# Patient Record
Sex: FEMALE | Race: ASIAN | NOT HISPANIC OR LATINO | URBAN - METROPOLITAN AREA
[De-identification: names, ages, dates, MRNs, and addresses within clinical notes are randomized per-mention and may not be internally consistent; named-entity substitution may affect disease eponyms.]

---

## 2017-01-03 ENCOUNTER — OUTPATIENT (OUTPATIENT)
Dept: OUTPATIENT SERVICES | Facility: HOSPITAL | Age: 47
LOS: 1 days | End: 2017-01-03
Payer: MEDICAID

## 2017-01-03 ENCOUNTER — APPOINTMENT (OUTPATIENT)
Dept: GASTROENTEROLOGY | Facility: HOSPITAL | Age: 47
End: 2017-01-03

## 2017-01-03 VITALS
HEART RATE: 67 BPM | DIASTOLIC BLOOD PRESSURE: 80 MMHG | WEIGHT: 137 LBS | HEIGHT: 69 IN | BODY MASS INDEX: 20.29 KG/M2 | SYSTOLIC BLOOD PRESSURE: 123 MMHG

## 2017-01-03 DIAGNOSIS — K31.9 DISEASE OF STOMACH AND DUODENUM, UNSPECIFIED: ICD-10-CM

## 2017-01-03 DIAGNOSIS — K83.9 DISEASE OF BILIARY TRACT, UNSPECIFIED: ICD-10-CM

## 2017-01-03 PROCEDURE — G0463: CPT

## 2017-01-04 DIAGNOSIS — K83.9 DISEASE OF BILIARY TRACT, UNSPECIFIED: ICD-10-CM

## 2017-01-13 ENCOUNTER — APPOINTMENT (OUTPATIENT)
Dept: MRI IMAGING | Facility: CLINIC | Age: 47
End: 2017-01-13

## 2017-01-13 ENCOUNTER — OUTPATIENT (OUTPATIENT)
Dept: OUTPATIENT SERVICES | Facility: HOSPITAL | Age: 47
LOS: 1 days | End: 2017-01-13
Payer: MEDICAID

## 2017-01-13 DIAGNOSIS — Z00.8 ENCOUNTER FOR OTHER GENERAL EXAMINATION: ICD-10-CM

## 2017-01-13 DIAGNOSIS — K83.9 DISEASE OF BILIARY TRACT, UNSPECIFIED: ICD-10-CM

## 2017-01-13 PROCEDURE — 74183 MRI ABD W/O CNTR FLWD CNTR: CPT

## 2017-01-13 PROCEDURE — A9585: CPT

## 2017-06-14 ENCOUNTER — INPATIENT (INPATIENT)
Facility: HOSPITAL | Age: 47
LOS: 0 days | Discharge: HOME | DRG: 552 | End: 2017-06-15
Attending: HOSPITALIST | Admitting: STUDENT IN AN ORGANIZED HEALTH CARE EDUCATION/TRAINING PROGRAM
Payer: MEDICAID

## 2017-06-14 VITALS
SYSTOLIC BLOOD PRESSURE: 129 MMHG | RESPIRATION RATE: 18 BRPM | TEMPERATURE: 98 F | HEART RATE: 60 BPM | DIASTOLIC BLOOD PRESSURE: 87 MMHG | OXYGEN SATURATION: 100 %

## 2017-06-14 LAB
ALBUMIN SERPL ELPH-MCNC: 4.5 G/DL — SIGNIFICANT CHANGE UP (ref 3.3–5)
ALP SERPL-CCNC: 38 U/L — LOW (ref 40–120)
ALT FLD-CCNC: 15 U/L RC — SIGNIFICANT CHANGE UP (ref 10–45)
ANION GAP SERPL CALC-SCNC: 11 MMOL/L — SIGNIFICANT CHANGE UP (ref 5–17)
APPEARANCE UR: CLEAR — SIGNIFICANT CHANGE UP
APTT BLD: 41.1 SEC — HIGH (ref 27.5–37.4)
AST SERPL-CCNC: 17 U/L — SIGNIFICANT CHANGE UP (ref 10–40)
BASOPHILS # BLD AUTO: 0 K/UL — SIGNIFICANT CHANGE UP (ref 0–0.2)
BASOPHILS NFR BLD AUTO: 0.4 % — SIGNIFICANT CHANGE UP (ref 0–2)
BILIRUB SERPL-MCNC: 0.4 MG/DL — SIGNIFICANT CHANGE UP (ref 0.2–1.2)
BILIRUB UR-MCNC: NEGATIVE — SIGNIFICANT CHANGE UP
BUN SERPL-MCNC: 8 MG/DL — SIGNIFICANT CHANGE UP (ref 7–23)
CALCIUM SERPL-MCNC: 8.8 MG/DL — SIGNIFICANT CHANGE UP (ref 8.4–10.5)
CHLORIDE SERPL-SCNC: 105 MMOL/L — SIGNIFICANT CHANGE UP (ref 96–108)
CO2 SERPL-SCNC: 25 MMOL/L — SIGNIFICANT CHANGE UP (ref 22–31)
COLOR SPEC: YELLOW — SIGNIFICANT CHANGE UP
CREAT SERPL-MCNC: 0.63 MG/DL — SIGNIFICANT CHANGE UP (ref 0.5–1.3)
DIFF PNL FLD: NEGATIVE — SIGNIFICANT CHANGE UP
EOSINOPHIL # BLD AUTO: 0.1 K/UL — SIGNIFICANT CHANGE UP (ref 0–0.5)
EOSINOPHIL NFR BLD AUTO: 1.2 % — SIGNIFICANT CHANGE UP (ref 0–6)
EPI CELLS # UR: SIGNIFICANT CHANGE UP /HPF
GLUCOSE SERPL-MCNC: 94 MG/DL — SIGNIFICANT CHANGE UP (ref 70–99)
GLUCOSE UR QL: NEGATIVE — SIGNIFICANT CHANGE UP
HCT VFR BLD CALC: 36.2 % — SIGNIFICANT CHANGE UP (ref 34.5–45)
HGB BLD-MCNC: 12.5 G/DL — SIGNIFICANT CHANGE UP (ref 11.5–15.5)
INR BLD: 1.09 RATIO — SIGNIFICANT CHANGE UP (ref 0.88–1.16)
KETONES UR-MCNC: NEGATIVE — SIGNIFICANT CHANGE UP
LEUKOCYTE ESTERASE UR-ACNC: NEGATIVE — SIGNIFICANT CHANGE UP
LYMPHOCYTES # BLD AUTO: 1.5 K/UL — SIGNIFICANT CHANGE UP (ref 1–3.3)
LYMPHOCYTES # BLD AUTO: 25.7 % — SIGNIFICANT CHANGE UP (ref 13–44)
MCHC RBC-ENTMCNC: 32.2 PG — SIGNIFICANT CHANGE UP (ref 27–34)
MCHC RBC-ENTMCNC: 34.4 GM/DL — SIGNIFICANT CHANGE UP (ref 32–36)
MCV RBC AUTO: 93.5 FL — SIGNIFICANT CHANGE UP (ref 80–100)
MONOCYTES # BLD AUTO: 0.4 K/UL — SIGNIFICANT CHANGE UP (ref 0–0.9)
MONOCYTES NFR BLD AUTO: 6.7 % — SIGNIFICANT CHANGE UP (ref 2–14)
NEUTROPHILS # BLD AUTO: 3.9 K/UL — SIGNIFICANT CHANGE UP (ref 1.8–7.4)
NEUTROPHILS NFR BLD AUTO: 66 % — SIGNIFICANT CHANGE UP (ref 43–77)
NITRITE UR-MCNC: NEGATIVE — SIGNIFICANT CHANGE UP
PH UR: 7 — SIGNIFICANT CHANGE UP (ref 5–8)
PLATELET # BLD AUTO: 227 K/UL — SIGNIFICANT CHANGE UP (ref 150–400)
POTASSIUM SERPL-MCNC: 3.9 MMOL/L — SIGNIFICANT CHANGE UP (ref 3.5–5.3)
POTASSIUM SERPL-SCNC: 3.9 MMOL/L — SIGNIFICANT CHANGE UP (ref 3.5–5.3)
PROT SERPL-MCNC: 7.2 G/DL — SIGNIFICANT CHANGE UP (ref 6–8.3)
PROT UR-MCNC: NEGATIVE — SIGNIFICANT CHANGE UP
PROTHROM AB SERPL-ACNC: 11.9 SEC — SIGNIFICANT CHANGE UP (ref 9.8–12.7)
RBC # BLD: 3.87 M/UL — SIGNIFICANT CHANGE UP (ref 3.8–5.2)
RBC # FLD: 10.9 % — SIGNIFICANT CHANGE UP (ref 10.3–14.5)
SODIUM SERPL-SCNC: 141 MMOL/L — SIGNIFICANT CHANGE UP (ref 135–145)
SP GR SPEC: 1.01 — SIGNIFICANT CHANGE UP (ref 1.01–1.02)
UROBILINOGEN FLD QL: NEGATIVE — SIGNIFICANT CHANGE UP
WBC # BLD: 6 K/UL — SIGNIFICANT CHANGE UP (ref 3.8–10.5)
WBC # FLD AUTO: 6 K/UL — SIGNIFICANT CHANGE UP (ref 3.8–10.5)

## 2017-06-14 PROCEDURE — 99285 EMERGENCY DEPT VISIT HI MDM: CPT

## 2017-06-14 RX ORDER — DIAZEPAM 5 MG
5 TABLET ORAL ONCE
Qty: 0 | Refills: 0 | Status: DISCONTINUED | OUTPATIENT
Start: 2017-06-14 | End: 2017-06-14

## 2017-06-14 RX ORDER — MORPHINE SULFATE 50 MG/1
4 CAPSULE, EXTENDED RELEASE ORAL ONCE
Qty: 0 | Refills: 0 | Status: DISCONTINUED | OUTPATIENT
Start: 2017-06-14 | End: 2017-06-14

## 2017-06-14 RX ORDER — IBUPROFEN 200 MG
600 TABLET ORAL ONCE
Qty: 0 | Refills: 0 | Status: COMPLETED | OUTPATIENT
Start: 2017-06-14 | End: 2017-06-14

## 2017-06-14 RX ORDER — OXYCODONE HYDROCHLORIDE 5 MG/1
5 TABLET ORAL ONCE
Qty: 0 | Refills: 0 | Status: DISCONTINUED | OUTPATIENT
Start: 2017-06-14 | End: 2017-06-14

## 2017-06-14 RX ORDER — SODIUM CHLORIDE 9 MG/ML
1000 INJECTION INTRAMUSCULAR; INTRAVENOUS; SUBCUTANEOUS ONCE
Qty: 0 | Refills: 0 | Status: COMPLETED | OUTPATIENT
Start: 2017-06-14 | End: 2017-06-14

## 2017-06-14 RX ORDER — ONDANSETRON 8 MG/1
4 TABLET, FILM COATED ORAL ONCE
Qty: 0 | Refills: 0 | Status: COMPLETED | OUTPATIENT
Start: 2017-06-14 | End: 2017-06-14

## 2017-06-14 RX ORDER — ACETAMINOPHEN 500 MG
1000 TABLET ORAL ONCE
Qty: 0 | Refills: 0 | Status: COMPLETED | OUTPATIENT
Start: 2017-06-14 | End: 2017-06-14

## 2017-06-14 RX ADMIN — Medication 400 MILLIGRAM(S): at 18:25

## 2017-06-14 RX ADMIN — MORPHINE SULFATE 4 MILLIGRAM(S): 50 CAPSULE, EXTENDED RELEASE ORAL at 22:09

## 2017-06-14 RX ADMIN — Medication 600 MILLIGRAM(S): at 18:24

## 2017-06-14 RX ADMIN — SODIUM CHLORIDE 1000 MILLILITER(S): 9 INJECTION INTRAMUSCULAR; INTRAVENOUS; SUBCUTANEOUS at 22:56

## 2017-06-14 RX ADMIN — OXYCODONE HYDROCHLORIDE 5 MILLIGRAM(S): 5 TABLET ORAL at 21:11

## 2017-06-14 RX ADMIN — Medication 1000 MILLIGRAM(S): at 21:09

## 2017-06-14 RX ADMIN — ONDANSETRON 4 MILLIGRAM(S): 8 TABLET, FILM COATED ORAL at 22:56

## 2017-06-14 RX ADMIN — Medication 5 MILLIGRAM(S): at 19:41

## 2017-06-14 RX ADMIN — Medication 600 MILLIGRAM(S): at 21:09

## 2017-06-14 RX ADMIN — OXYCODONE HYDROCHLORIDE 5 MILLIGRAM(S): 5 TABLET ORAL at 22:09

## 2017-06-14 NOTE — ED PROVIDER NOTE - ATTENDING CONTRIBUTION TO CARE
pt is a 45 y/o female with c/o lower back pain, nvi to le, no change in bowel or bladder habits, ua and labs nl, mild lower abd pain, period one week late, upreg pending if nl to d.c home with pain control. likely lumbar strain vs sciatica noted.

## 2017-06-14 NOTE — ED PROVIDER NOTE - OBJECTIVE STATEMENT
46 F h/o pyelonephritis here for low back pain. Severe LBP pain, radiating to groin, onset Sunday, no HU, severe, worse with movement, no melena, no HS, radiates to R leg to knee. Has happened before, admitted for this pain in the past.

## 2017-06-14 NOTE — ED PROVIDER NOTE - PROGRESS NOTE DETAILS
received signout from Dr. Muhammad. pt received valium for b/l LBP, had pt get up to see if pt can walk now. pt took a couple steps and was unable to move/walk 2/2 pain. case discussed with Dr. Dill, will give oxycodone and reassess. -BEAU Hoang pt states pain is unchanged even with valium and oxycodone, pain still 8/10, pt unable to stand up straight 2/2 pain, but able to walk with support. will give morphine and re-evaluate. discussed with pt that if pain improves she can go home, but if not pt will need tba. - BEAU Hoang. pain improved with morphine but pt c/o not feeling well now 2/2 morphine - has nausea. will give IVF and zofran. will re-eval. -BEAU Hoang pt still having pain and unable to walk now. PMD Dr. Flash Chawla. case discussed with Dr. Dill and Dr. Cloud; tba. -BEAU Hoang.

## 2017-06-14 NOTE — ED PROVIDER NOTE - PHYSICAL EXAMINATION
Gen: NAD, AOx3, non-toxic // Head: NCAT // HEENT: EOMI, oral mucosa moist, normal conjunctiva // Lung: CTAB, no respiratory distress, no wheezes/rhonchi/rales B/L, speaking in full sentences. // CV: RRR, no murmurs, rubs or gallops // Abd: soft, tender in bilateral lower quadrants, no guarding, no CVA tenderness // MSK: TTP of lower back across L4-L5 and b/l paraspinal MMs. no visible deformities. +Straight leg raise // Neuro: No focal sensory or motor deficits // Skin: Warm, well perfused, no rash // Psych: normal affect

## 2017-06-14 NOTE — ED ADULT NURSE NOTE - CHPI ED SYMPTOMS NEG
no abrasion/no difficulty bearing weight/no tingling/no bruising/no numbness/no deformity/no stiffness/no fever/no weakness

## 2017-06-14 NOTE — ED ADULT NURSE NOTE - OBJECTIVE STATEMENT
46 yr old female with no medical history present to the ER for lower back pain. As per pt she has been experiencing lower back pain for 1.5 weeks now getting progressively worse. Pt reports that pain is getting progressively worse and over the weekend she was experiencing difficulty. Pt denies any recent injury or lifting heavy objects. Pt is ambulatory at this time. Denies any urinary problem.

## 2017-06-15 VITALS
HEART RATE: 51 BPM | DIASTOLIC BLOOD PRESSURE: 78 MMHG | RESPIRATION RATE: 19 BRPM | TEMPERATURE: 98 F | OXYGEN SATURATION: 97 % | SYSTOLIC BLOOD PRESSURE: 115 MMHG

## 2017-06-15 DIAGNOSIS — R63.8 OTHER SYMPTOMS AND SIGNS CONCERNING FOOD AND FLUID INTAKE: ICD-10-CM

## 2017-06-15 DIAGNOSIS — Z29.9 ENCOUNTER FOR PROPHYLACTIC MEASURES, UNSPECIFIED: ICD-10-CM

## 2017-06-15 DIAGNOSIS — M54.5 LOW BACK PAIN: ICD-10-CM

## 2017-06-15 LAB
ANION GAP SERPL CALC-SCNC: 9 MMOL/L — SIGNIFICANT CHANGE UP (ref 5–17)
BASOPHILS # BLD AUTO: 0.02 K/UL — SIGNIFICANT CHANGE UP (ref 0–0.2)
BASOPHILS NFR BLD AUTO: 0.4 % — SIGNIFICANT CHANGE UP (ref 0–2)
BUN SERPL-MCNC: 8 MG/DL — SIGNIFICANT CHANGE UP (ref 7–23)
CALCIUM SERPL-MCNC: 8.3 MG/DL — LOW (ref 8.4–10.5)
CHLORIDE SERPL-SCNC: 105 MMOL/L — SIGNIFICANT CHANGE UP (ref 96–108)
CO2 SERPL-SCNC: 22 MMOL/L — SIGNIFICANT CHANGE UP (ref 22–31)
CREAT SERPL-MCNC: 0.48 MG/DL — LOW (ref 0.5–1.3)
EOSINOPHIL # BLD AUTO: 0.05 K/UL — SIGNIFICANT CHANGE UP (ref 0–0.5)
EOSINOPHIL NFR BLD AUTO: 0.9 % — SIGNIFICANT CHANGE UP (ref 0–6)
GLUCOSE SERPL-MCNC: 95 MG/DL — SIGNIFICANT CHANGE UP (ref 70–99)
HCT VFR BLD CALC: 32.1 % — LOW (ref 34.5–45)
HGB BLD-MCNC: 10.6 G/DL — LOW (ref 11.5–15.5)
IMM GRANULOCYTES NFR BLD AUTO: 0.2 % — SIGNIFICANT CHANGE UP (ref 0–1.5)
LYMPHOCYTES # BLD AUTO: 1.14 K/UL — SIGNIFICANT CHANGE UP (ref 1–3.3)
LYMPHOCYTES # BLD AUTO: 21.3 % — SIGNIFICANT CHANGE UP (ref 13–44)
MCHC RBC-ENTMCNC: 29.4 PG — SIGNIFICANT CHANGE UP (ref 27–34)
MCHC RBC-ENTMCNC: 33 GM/DL — SIGNIFICANT CHANGE UP (ref 32–36)
MCV RBC AUTO: 89.2 FL — SIGNIFICANT CHANGE UP (ref 80–100)
MONOCYTES # BLD AUTO: 0.26 K/UL — SIGNIFICANT CHANGE UP (ref 0–0.9)
MONOCYTES NFR BLD AUTO: 4.9 % — SIGNIFICANT CHANGE UP (ref 2–14)
NEUTROPHILS # BLD AUTO: 3.88 K/UL — SIGNIFICANT CHANGE UP (ref 1.8–7.4)
NEUTROPHILS NFR BLD AUTO: 72.3 % — SIGNIFICANT CHANGE UP (ref 43–77)
PLATELET # BLD AUTO: 204 K/UL — SIGNIFICANT CHANGE UP (ref 150–400)
POTASSIUM SERPL-MCNC: 3.9 MMOL/L — SIGNIFICANT CHANGE UP (ref 3.5–5.3)
POTASSIUM SERPL-SCNC: 3.9 MMOL/L — SIGNIFICANT CHANGE UP (ref 3.5–5.3)
RBC # BLD: 3.6 M/UL — LOW (ref 3.8–5.2)
RBC # FLD: 12.1 % — SIGNIFICANT CHANGE UP (ref 10.3–14.5)
SODIUM SERPL-SCNC: 136 MMOL/L — SIGNIFICANT CHANGE UP (ref 135–145)
WBC # BLD: 5.36 K/UL — SIGNIFICANT CHANGE UP (ref 3.8–10.5)
WBC # FLD AUTO: 5.36 K/UL — SIGNIFICANT CHANGE UP (ref 3.8–10.5)

## 2017-06-15 PROCEDURE — 99239 HOSP IP/OBS DSCHRG MGMT >30: CPT

## 2017-06-15 PROCEDURE — 99223 1ST HOSP IP/OBS HIGH 75: CPT

## 2017-06-15 RX ORDER — HYDROMORPHONE HYDROCHLORIDE 2 MG/ML
0.5 INJECTION INTRAMUSCULAR; INTRAVENOUS; SUBCUTANEOUS EVERY 6 HOURS
Qty: 0 | Refills: 0 | Status: DISCONTINUED | OUTPATIENT
Start: 2017-06-15 | End: 2017-06-15

## 2017-06-15 RX ORDER — ASCORBIC ACID 60 MG
1 TABLET,CHEWABLE ORAL
Qty: 0 | Refills: 0 | COMMUNITY

## 2017-06-15 RX ORDER — CELECOXIB 200 MG/1
200 CAPSULE ORAL DAILY
Qty: 0 | Refills: 0 | Status: DISCONTINUED | OUTPATIENT
Start: 2017-06-15 | End: 2017-06-15

## 2017-06-15 RX ORDER — CYCLOBENZAPRINE HYDROCHLORIDE 10 MG/1
1 TABLET, FILM COATED ORAL
Qty: 15 | Refills: 0 | OUTPATIENT
Start: 2017-06-15 | End: 2017-06-20

## 2017-06-15 RX ORDER — FAMOTIDINE 10 MG/ML
20 INJECTION INTRAVENOUS
Qty: 0 | Refills: 0 | Status: DISCONTINUED | OUTPATIENT
Start: 2017-06-15 | End: 2017-06-15

## 2017-06-15 RX ORDER — IBUPROFEN 200 MG
600 TABLET ORAL EVERY 8 HOURS
Qty: 0 | Refills: 0 | Status: DISCONTINUED | OUTPATIENT
Start: 2017-06-15 | End: 2017-06-15

## 2017-06-15 RX ORDER — OMEGA-3 ACID ETHYL ESTERS 1 G
1 CAPSULE ORAL
Qty: 0 | Refills: 0 | COMMUNITY

## 2017-06-15 RX ORDER — HYDROMORPHONE HYDROCHLORIDE 2 MG/ML
1 INJECTION INTRAMUSCULAR; INTRAVENOUS; SUBCUTANEOUS EVERY 6 HOURS
Qty: 0 | Refills: 0 | Status: DISCONTINUED | OUTPATIENT
Start: 2017-06-15 | End: 2017-06-15

## 2017-06-15 RX ORDER — LIDOCAINE 4 G/100G
1 CREAM TOPICAL EVERY 24 HOURS
Qty: 0 | Refills: 0 | Status: DISCONTINUED | OUTPATIENT
Start: 2017-06-15 | End: 2017-06-15

## 2017-06-15 RX ORDER — CYCLOBENZAPRINE HYDROCHLORIDE 10 MG/1
5 TABLET, FILM COATED ORAL THREE TIMES A DAY
Qty: 0 | Refills: 0 | Status: DISCONTINUED | OUTPATIENT
Start: 2017-06-15 | End: 2017-06-15

## 2017-06-15 RX ORDER — FAMOTIDINE 10 MG/ML
1 INJECTION INTRAVENOUS
Qty: 10 | Refills: 0 | OUTPATIENT
Start: 2017-06-15 | End: 2017-06-20

## 2017-06-15 RX ORDER — ACETAMINOPHEN 500 MG
1 TABLET ORAL
Qty: 0 | Refills: 0 | COMMUNITY

## 2017-06-15 RX ORDER — MELOXICAM 15 MG/1
1 TABLET ORAL
Qty: 5 | Refills: 0 | OUTPATIENT
Start: 2017-06-15 | End: 2017-06-20

## 2017-06-15 RX ORDER — ENOXAPARIN SODIUM 100 MG/ML
40 INJECTION SUBCUTANEOUS DAILY
Qty: 0 | Refills: 0 | Status: DISCONTINUED | OUTPATIENT
Start: 2017-06-15 | End: 2017-06-15

## 2017-06-15 RX ORDER — ACETAMINOPHEN 500 MG
650 TABLET ORAL EVERY 6 HOURS
Qty: 0 | Refills: 0 | Status: DISCONTINUED | OUTPATIENT
Start: 2017-06-15 | End: 2017-06-15

## 2017-06-15 RX ORDER — OXYCODONE HYDROCHLORIDE 5 MG/1
1 TABLET ORAL
Qty: 30 | Refills: 0 | OUTPATIENT
Start: 2017-06-15 | End: 2017-06-20

## 2017-06-15 RX ORDER — OXYCODONE HYDROCHLORIDE 5 MG/1
5 TABLET ORAL EVERY 4 HOURS
Qty: 0 | Refills: 0 | Status: DISCONTINUED | OUTPATIENT
Start: 2017-06-15 | End: 2017-06-15

## 2017-06-15 RX ADMIN — Medication 650 MILLIGRAM(S): at 15:00

## 2017-06-15 RX ADMIN — Medication 650 MILLIGRAM(S): at 14:00

## 2017-06-15 RX ADMIN — Medication 600 MILLIGRAM(S): at 10:45

## 2017-06-15 RX ADMIN — Medication 600 MILLIGRAM(S): at 09:45

## 2017-06-15 RX ADMIN — CYCLOBENZAPRINE HYDROCHLORIDE 5 MILLIGRAM(S): 10 TABLET, FILM COATED ORAL at 14:33

## 2017-06-15 RX ADMIN — CELECOXIB 200 MILLIGRAM(S): 200 CAPSULE ORAL at 15:00

## 2017-06-15 RX ADMIN — LIDOCAINE 1 PATCH: 4 CREAM TOPICAL at 06:34

## 2017-06-15 RX ADMIN — CELECOXIB 200 MILLIGRAM(S): 200 CAPSULE ORAL at 14:33

## 2017-06-15 NOTE — PHYSICAL THERAPY INITIAL EVALUATION ADULT - PERTINENT HX OF CURRENT PROBLEM, REHAB EVAL
46 year old woman with history of pyelonephritis (admitted to surgery service 5/2016), PUD presenting with acute onset lower back pain x 4 days. Patient notes that 4 days PTA she was returning from [facility] when she had acute onset of lower back pain. Describes the pain as 6/10 at rest to 10/10 with most motions. Has take tylenol at home with minimal improvement. 46 year old woman with history of pyelonephritis (admitted to surgery service 5/2016), PUD presenting with acute onset lower back pain x 4 days. Patient notes that 4 days PTA she was returning from [place] when she had acute onset of lower back pain. Describes the pain as 6/10 at rest to 10/10 with most motions. Has take tylenol at home with minimal improvement.  ikely sciatica and muscle strain in setting of prolonged studying and stress, 46 year old woman with history of pyelonephritis (admitted to surgery service 5/2016), PUD presenting with acute onset lower back pain x 4 days. Patient notes that 4 days PTA she was returning from [employer] when she had acute onset of lower back pain. Describes the pain as 6/10 at rest to 10/10 with most motions. Has take tylenol at home with minimal improvement.

## 2017-06-15 NOTE — H&P ADULT - NSHPSOCIALHISTORY_GEN_ALL_CORE
Patient is single, was a  in [place] and is currently studying for bar in [place].  Denies tobacco, EtOH, illicits.

## 2017-06-15 NOTE — DISCHARGE NOTE ADULT - PRINCIPAL DIAGNOSIS
Low back pain, unspecified back pain laterality, unspecified chronicity, with sciatica presence unspecified

## 2017-06-15 NOTE — DISCHARGE NOTE ADULT - PROVIDER TOKENS
FREE:[LAST:[General Internal Medicine],PHONE:[(   )    -],FAX:[(   )    -],ADDRESS:[[facility]    [address] [place] [address]   [phone]]]

## 2017-06-15 NOTE — PHYSICAL THERAPY INITIAL EVALUATION ADULT - DIAGNOSIS, PT EVAL
Pt present with decreased mobility secondary to low back pain. Pt presents with decreased mobility secondary to low back pain.

## 2017-06-15 NOTE — DISCHARGE NOTE ADULT - CARE PLAN
Principal Discharge DX:	Low back pain, unspecified back pain laterality, unspecified chronicity, with sciatica presence unspecified  Goal:	resolved pain and followed up at clinic in 1 weeks  Instructions for follow-up, activity and diet:	HOME CARE INSTRUCTIONS  For many people, back pain returns. Since low back pain is rarely dangerous, it is often a condition that people can learn to manage on their own   Remain active. It is stressful on the back to sit or  one place. Do not sit, drive, or  one place for more than 30 minutes at a time. Take short walks on level surfaces as soon as pain allows. Try to increase the length of time you walk each day.  Do not stay in bed. Resting more than 1 or 2 days can delay your recovery.  Do not avoid exercise or work. Your body is made to move. It is not dangerous to be active, even though your back may hurt. Your back will likely heal faster if you return to being active before your pain is gone.   Only take over-the-counter or prescription medicines as directed by your caregiver. Over-the-counter medicines to reduce pain and inflammation are often the most helpful.  Your caregiver may prescribe muscle relaxant drugs. These medicines help dull your pain so you can more quickly return to your normal activities and healthy exercise.  Avoid feeling anxious or stressed. Stress increases muscle tension and can worsen back pain. It is important to recognize when you are anxious or stressed and learn ways to manage it. Exercise is a great option.  SEEK MEDICAL CARE IF:  You have pain that is not relieved with rest or medicine.  You have pain that does not improve in 1 week.  You have new symptoms.  You are generally not feeling well.  SEEK IMMEDIATE MEDICAL CARE IF:  You have pain that radiates from your back into your legs.  You develop new bowel or bladder control problems.  You have unusual weakness or numbness in your arms or legs.  You develop nausea or vomiting.  You develop abdominal pain.  You feel faint.

## 2017-06-15 NOTE — H&P ADULT - ASSESSMENT
This is a 46 year old woman with history of pyelonephritis (admitted to surgery service 5/2016), PUD presenting with acute onset lower back pain x 4 days. Suspect musculoskeletal in setting of prolonged studying and stress. Though patient has endorsed chills and cold sweats, she has been HD stable here, no fevers, no leukocytosis, pointing away from infectious etiology. Sore throat is non-specific, and exam is benign, UA is clean and no CVA tenderness.  She does note vaginal discharge but is currently refusing vaginal exam, and suspicion is low enough that will defer for now.  Consider pancreatitis given reported history of abnormal biliary drainage. Case discussed with Dr. Cloud.

## 2017-06-15 NOTE — DISCHARGE NOTE ADULT - PATIENT PORTAL LINK FT
“You can access the FollowHealth Patient Portal, offered by [facility], by registering with the following website: [email or web address]”

## 2017-06-15 NOTE — PROGRESS NOTE ADULT - PROBLEM SELECTOR PLAN 1
-likely muscle strain/sciatica.  -no bowel/bladder incontinence or fever/wbc.  -plan for d/c home on short course of meloxicam 7.5mg daily with pepcid given ulcer history (last egd showed gastritis but no active ulcer/bleed), flexeril prn, tylenol, oxy prn  -PT eval  -check bladder scan for mild suprapubic tenderness, but UA negative

## 2017-06-15 NOTE — PHYSICAL THERAPY INITIAL EVALUATION ADULT - CRITERIA FOR SKILLED THERAPEUTIC INTERVENTIONS
D/C PT-pt independent. Pt instructed in back wellness and safe mobility and  proper use of rolling walker .

## 2017-06-15 NOTE — PROGRESS NOTE ADULT - ASSESSMENT
46 year old woman with history of pyelonephritis (admitted to surgery service 5/2016), PUD presenting with acute onset lower back pain x 4 days, likely sciatica and muscle strain in setting of prolonged studying and stress, non toxic and no red flag signs/symptoms for cord injury, plan for PT eval and d/c home on nsaids/muscle relaxants.

## 2017-06-15 NOTE — DISCHARGE NOTE ADULT - ADDITIONAL INSTRUCTIONS
1. Take all prescription medications as prescribed   2. Call for an appointment and follow up at the medicine clinic in 1 week for review of your hospital course   3. You may return for any worsening or return symptoms.

## 2017-06-15 NOTE — PROGRESS NOTE ADULT - SUBJECTIVE AND OBJECTIVE BOX
Patient is a 46y old  Female who presents with a chief complaint of back pain (15 Jaden 2017 04:07)      SUBJECTIVE / OVERNIGHT EVENTS:  still c/o lower back pain, worse with change of position and putting pressure on while ambulating.  It initially started when she was getting out of her car, denies shooting pain/numbness.  Feels she is overly compensating with her knees as well.  Denies trauma/fall.  No bladder/bowel incontinence.  No dysuria.    MEDICATIONS  (STANDING):  lidocaine   Patch 1Patch Transdermal every 24 hours  enoxaparin Injectable 40milliGRAM(s) SubCutaneous daily  celecoxib 200milliGRAM(s) Oral daily  famotidine    Tablet 20milliGRAM(s) Oral two times a day  acetaminophen   Tablet. 650milliGRAM(s) Oral every 6 hours    MEDICATIONS  (PRN):  cyclobenzaprine 5milliGRAM(s) Oral three times a day PRN Muscle Spasm  oxyCODONE IR 5milliGRAM(s) Oral every 4 hours PRN breakthrough pain      Vital Signs Last 24 Hrs  T(C): 37, Max: 37 (06-15 @ 07:34)  HR: 73 (54 - 73)  BP: 144/46 (101/70 - 144/46)  RR: 19 (16 - 19)  SpO2: 98% (97% - 100%)  Wt(kg): --  CAPILLARY BLOOD GLUCOSE    I&O's Summary      PHYSICAL EXAM:  GENERAL: NAD, well-developed  HEAD:  Atraumatic, Normocephalic  EYES: EOMI, conjunctiva and sclera clear  NECK: Supple, No JVD  CHEST/LUNG: Clear to auscultation bilaterally; No wheeze  HEART: Regular rate and rhythm; No murmurs, rubs, or gallops  ABDOMEN: Soft, Nondistended; Bowel sounds present; mild suprapubic tenderness  EXTREMITIES:  2+ Peripheral Pulses, No clubbing, cyanosis, or edema  PSYCH: AAOx3  NEUROLOGY: +left straight leg test.  No numbness.  Patellar reflex 2+ b/l.  Flexion on left limited due to pain.  +lumbar spine tenderness and paraspinal tenderness.  No CVAT.    LABS:                        10.6   5.36  )-----------( 204      ( 15 Jaden 2017 07:35 )             32.1     06-15    136  |  105  |  8   ----------------------------<  95  3.9   |  22  |  0.48<L>    Ca    8.3<L>      15 Jaden 2017 07:28    TPro  7.2  /  Alb  4.5  /  TBili  0.4  /  DBili  x   /  AST  17  /  ALT  15  /  AlkPhos  38<L>  06-14    PT/INR - ( 2017 18:27 )   PT: 11.9 sec;   INR: 1.09 ratio         PTT - ( 2017 18:27 )  PTT:41.1 sec      Urinalysis Basic - ( 2017 18:28 )    Color: Yellow / Appearance: Clear / S.012 / pH: x  Gluc: x / Ketone: Negative  / Bili: Negative / Urobili: Negative   Blood: x / Protein: Negative / Nitrite: Negative   Leuk Esterase: Negative / RBC: x / WBC x   Sq Epi: x / Non Sq Epi: OCC /HPF / Bacteria: x        RADIOLOGY & ADDITIONAL TESTS:    Imaging Personally Reviewed:    Consultant(s) Notes Reviewed:      Care Discussed with Consultants/Other Providers:

## 2017-06-15 NOTE — H&P ADULT - NSHPLABSRESULTS_GEN_ALL_CORE
Labs personally reviewed.                        12.5   6.0   )-----------( 227      ( 2017 18:27 )             36.2     -    141  |  105  |  8   ----------------------------<  94  3.9   |  25  |  0.63    Ca    8.8      2017 18:27    TPro  7.2  /  Alb  4.5  /  TBili  0.4  /  DBili  x   /  AST  17  /  ALT  15  /  AlkPhos  38<L>          LIVER FUNCTIONS - ( 2017 18:27 )  Alb: 4.5 g/dL / Pro: 7.2 g/dL / ALK PHOS: 38 U/L / ALT: 15 U/L RC / AST: 17 U/L / GGT: x           PT/INR - ( 2017 18:27 )   PT: 11.9 sec;   INR: 1.09 ratio         PTT - ( 2017 18:27 )  PTT:41.1 sec  Urinalysis Basic - ( 2017 18:28 )    Color: Yellow / Appearance: Clear / S.012 / pH: x  Gluc: x / Ketone: Negative  / Bili: Negative / Urobili: Negative   Blood: x / Protein: Negative / Nitrite: Negative   Leuk Esterase: Negative / RBC: x / WBC x   Sq Epi: x / Non Sq Epi: OCC /HPF / Bacteria: x        Imaging personally reviewed.  CXR clear.      Tracing personally reviewed.  NSR, no ST changes, no TWI.

## 2017-06-15 NOTE — DISCHARGE NOTE ADULT - HOSPITAL COURSE
46 year old woman with history of pyelonephritis (admitted to surgery service 5/2016), PUD presenting with acute onset lower back pain x 4 days, likely sciatica and muscle strain in setting of prolonged studying and stress, non toxic and no red flag signs/symptoms for cord injury, seen by PT and will discharge home on nsaids/muscle relaxants for short course.  She was given pepcid with meloxicam for her h/o PUD, with last EGD showing no ulcer, but gastritis present, reports no active bleeding.

## 2017-06-15 NOTE — PHYSICAL THERAPY INITIAL EVALUATION ADULT - LEVEL OF INDEPENDENCE: SIT/SUPINE, REHAB EVAL
independent/Pt instructed in logroll technique, able to perform bed mobility independently post instruction.

## 2017-06-15 NOTE — H&P ADULT - NSHPPHYSICALEXAM_GEN_ALL_CORE
GENERAL: No acute distress, well-developed, pain with minimal movement, though at rest no distress  HEAD:  Atraumatic, Normocephalic  ENT: EOMI, PERRLA, conjunctiva and sclera clear, Neck supple, No JVD, moist mucosa  CHEST/LUNG: Clear to auscultation bilaterally; No wheeze, equal breath sounds bilaterally   BACK: No spinal tenderness, no paraspinal tenderness, no CVA tenderness  HEART: Regular rate and rhythm; No murmurs, rubs, or gallops  ABDOMEN: +BS, mild epigastric tenderness which she notes radiates to lower back, mild suprapubic tenderness which she also notes radiates to lower back  EXTREMITIES:  No clubbing, cyanosis, or edema  PSYCH: Nl behavior, nl affect  NEUROLOGY: AAOx3, non-focal, cranial nerves intact, no saddle anesthesia  SKIN: Normal color, No rashes or lesions

## 2017-06-15 NOTE — DISCHARGE NOTE ADULT - PLAN OF CARE
resolved pain and followed up at clinic in 1 weeks HOME CARE INSTRUCTIONS  For many people, back pain returns. Since low back pain is rarely dangerous, it is often a condition that people can learn to manage on their own   Remain active. It is stressful on the back to sit or  one place. Do not sit, drive, or  one place for more than 30 minutes at a time. Take short walks on level surfaces as soon as pain allows. Try to increase the length of time you walk each day.  Do not stay in bed. Resting more than 1 or 2 days can delay your recovery.  Do not avoid exercise or work. Your body is made to move. It is not dangerous to be active, even though your back may hurt. Your back will likely heal faster if you return to being active before your pain is gone.   Only take over-the-counter or prescription medicines as directed by your caregiver. Over-the-counter medicines to reduce pain and inflammation are often the most helpful.  Your caregiver may prescribe muscle relaxant drugs. These medicines help dull your pain so you can more quickly return to your normal activities and healthy exercise.  Avoid feeling anxious or stressed. Stress increases muscle tension and can worsen back pain. It is important to recognize when you are anxious or stressed and learn ways to manage it. Exercise is a great option.  SEEK MEDICAL CARE IF:  You have pain that is not relieved with rest or medicine.  You have pain that does not improve in 1 week.  You have new symptoms.  You are generally not feeling well.  SEEK IMMEDIATE MEDICAL CARE IF:  You have pain that radiates from your back into your legs.  You develop new bowel or bladder control problems.  You have unusual weakness or numbness in your arms or legs.  You develop nausea or vomiting.  You develop abdominal pain.  You feel faint.

## 2017-06-15 NOTE — CHART NOTE - NSCHARTNOTEFT_GEN_A_CORE
Patient is a 46y old  Female who presents with a chief complaint of back pain (15 Jaden 2017 15:41)      Vital Signs Last 24 Hrs  T(C): 36.6, Max: 37 (06-15 @ 07:34)  T(F): 97.9, Max: 98.6 (06-15 @ 07:34)  HR: 51 (51 - 73)  BP: 115/78 (101/70 - 144/46)  BP(mean): --  RR: 19 (16 - 19)  SpO2: 97% (97% - 100%)      Labs:                          10.6   5.36  )-----------( 204      ( 15 Jaden 2017 07:35 )             32.1     06-15    136  |  105  |  8   ----------------------------<  95  3.9   |  22  |  0.48<L>    Ca    8.3<L>      15 Jaden 2017 07:28    TPro  7.2  /  Alb  4.5  /  TBili  0.4  /  DBili  x   /  AST  17  /  ALT  15  /  AlkPhos  38<L>  06-14            Radiology:    Physical Exam:  General: WN/WD NAD  Neurology: A&Ox3, nonfocal, LEMUS x 4  Head:  Normocephalic, atraumatic  Respiratory: CTA B/L  CV: RRR, S1S2, no murmur  Abdominal: Soft, NT, ND no palpable mass  MSK: No edema, + peripheral pulses, FROM all 4 extremity    Discharge Note and Plan:  >Follow up at the medicine clinic in 1 week   >Take all prescription medications   >Return for worsening symptoms   > Cased discussed with Dr Hartmann at length who agree with plan     Carlos Nguyen PAC   [id number]

## 2017-06-15 NOTE — PHYSICAL THERAPY INITIAL EVALUATION ADULT - LEVEL OF INDEPENDENCE: GAIT, REHAB EVAL
Pt ambulated ~ 10 ft holding on to wall within hallway with supervision. Pt ambulated with stooped posture, however, steady. Pt ambulated an additional 75 ft with rolling walker independently. Pt able to ambulate upright with use of rolling walker, no significant gait deviations noted./independent

## 2017-06-15 NOTE — H&P ADULT - HISTORY OF PRESENT ILLNESS
This is a 46 year old woman with history of pyelonephritis (admitted to surgery service 5/2016), PUD presenting with acute onset lower back pain x 4 days. Patient notes that 4 days PTA she was returning from [employer] when she had acute onset of lower back pain. Describes the pain as 6/10 at rest to 10/10 with most motions. Has take tylenol at home with minimal improvement. 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 She notes significant anxiety associated with this exam. She denies any recent trauma. She notes a mild sore throat with chills and cold sweats (notes having to change her clothes during the day) x 3 days.  She denies cough, notes chronic runny nose, denies shortness of breath, notes constipation during the last 4 days but denies incontinence of urine or stool, notes malodorous white vaginal discharge x 2 days, not currently sexually active, has only had a single male partner and has not been sexually active for years, though is not aware of having been tested for STIs in the past. She also notes that she is nromally regular in her periods, but currently is several days late.  Notably, patient was admitted ~1 year ago with pyelo. She notes that this pain is somewhat similar, though then she had also had dysuria and pain was asymmetric. She was found incidentally to have PUD and also some biliary drainage abnormalities which persisted on out-patient follow-up MRCP.      In ED, Tmax 36.6, HR 54-71, -129/70-80, satting well on RA. Labs notable for WBC 6 with normal diff, Plt 227, Cr 0.63, LFT WNL, coags WNL, UA without signs of infection.  She received morpine

## 2017-06-15 NOTE — PHYSICAL THERAPY INITIAL EVALUATION ADULT - ADDITIONAL COMMENTS
pertinent history continued... pertinent history continued...  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 She notes significant anxiety associated with this exam. She denies any recent trauma. She notes a mild sore throat with chills and cold sweats (notes having to change her clothes during the day) x 3 days.  She denies cough, notes chronic runny nose, denies shortness of breath, notes constipation during the last 4 days but denies incontinence of urine or stool, notes malodorous white vaginal discharge x 2 days, not currently sexually active, has only had a single male partner and has not been sexually active for years, though is not aware of having been tested for STIs in the past. pertinent history continued...  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 She notes significant anxiety associated with this exam. She denies any recent trauma. She notes a mild sore throat with chills and cold sweats (notes having to change her clothes during the day) x 3 days.  She denies cough, notes chronic runny nose, denies shortness of breath, notes constipation during the last 4 days but denies incontinence of urine or stool, notes malodorous white vaginal discharge x 2 days, not currently sexually active, has only had a single male partner and has not been sexually active for years, though is not aware of having been tested for STIs in the past.  In ED, Tmax 36.6, HR 54-71, -129/70-80, satting well on RA. Labs notable for WBC 6 with normal diff, Plt 227, Cr 0.63, LFT WNL, coags WNL, UA without signs of infection.  She received morpine pertinent history continued...  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 She notes significant anxiety associated with this exam. She denies any recent trauma. She notes a mild sore throat with chills and cold sweats (notes having to change her clothes during the day) x 3 days.  She denies cough, notes chronic runny nose, denies shortness of breath, notes constipation during the last 4 days but denies incontinence of urine or stool, notes malodorous white vaginal discharge x 2 days, not currently sexually active, has only had a single male partner and has not been sexually active for years, though is not aware of having been tested for STIs in the past.  In ED, Tmax 36.6, HR 54-71, -129/70-80, satting well on RA. Labs notable for WBC 6 with normal diff, Plt 227, Cr 0.63, LFT WNL, coags WNL, UA without signs of infection.  She received morpine.

## 2017-06-15 NOTE — DISCHARGE NOTE ADULT - CARE PROVIDER_API CALL
General Internal Medicine,   General Internal Medicine    [address] [facility], [facility] [address]   [place] [address]   [phone]  Phone: (   )    -  Fax: (   )    -

## 2017-06-15 NOTE — DISCHARGE NOTE ADULT - MEDICATION SUMMARY - MEDICATIONS TO TAKE
I will START or STAY ON the medications listed below when I get home from the hospital:    vitamin b complex  -- 1 tab(s) by mouth once a day  -- Indication: For supplement    Tylenol  -- 1 tab(s) by mouth every 4 hours, As Needed  -- Indication: For Pain    oxyCODONE 5 mg oral tablet  -- 1 tab(s) by mouth every 4 hours, As needed, breakthrough pain MDD:30mg/per day  -- Indication: For Pain    meloxicam 7.5 mg oral tablet  -- 1 tab(s) by mouth once a day  -- Do not take this drug if you are pregnant.  Obtain medical advice before taking any non-prescription drugs as some may affect the action of this medication.  Take with food or milk.    -- Indication: For Pain    famotidine 20 mg oral tablet  -- 1 tab(s) by mouth 2 times a day  -- Indication: For Stomach upset     cyclobenzaprine 5 mg oral tablet  -- 1 tab(s) by mouth 3 times a day, As needed, Muscle Spasm  -- Indication: For muscle relaxant     Fish Oil  -- 1  by mouth once a day  -- Indication: For supplement    Vitamin C 1000 mg oral tablet  -- 1 tab(s) by mouth once a day  -- Indication: For supplement

## 2017-12-15 PROCEDURE — 85027 COMPLETE CBC AUTOMATED: CPT

## 2017-12-15 PROCEDURE — G0378: CPT

## 2017-12-15 PROCEDURE — 97161 PT EVAL LOW COMPLEX 20 MIN: CPT

## 2017-12-15 PROCEDURE — 85610 PROTHROMBIN TIME: CPT

## 2017-12-15 PROCEDURE — 99285 EMERGENCY DEPT VISIT HI MDM: CPT | Mod: 25

## 2017-12-15 PROCEDURE — 96374 THER/PROPH/DIAG INJ IV PUSH: CPT

## 2017-12-15 PROCEDURE — 85730 THROMBOPLASTIN TIME PARTIAL: CPT

## 2017-12-15 PROCEDURE — 81001 URINALYSIS AUTO W/SCOPE: CPT

## 2017-12-15 PROCEDURE — 96375 TX/PRO/DX INJ NEW DRUG ADDON: CPT

## 2017-12-15 PROCEDURE — 80053 COMPREHEN METABOLIC PANEL: CPT

## 2017-12-15 PROCEDURE — 80048 BASIC METABOLIC PNL TOTAL CA: CPT

## 2022-06-21 NOTE — DISCHARGE NOTE ADULT - VISION (WITH CORRECTIVE LENSES IF THE PATIENT USUALLY WEARS THEM):
Filled out form for [facility] stating pf tears are to be instilled once a day to each eye. Faxed.   Normal vision: sees adequately in most situations; can see medication labels, newsprint

## 2024-01-19 NOTE — PROGRESS NOTE ADULT - PROBLEM SELECTOR PLAN 2
Called and left voicemail, will try again tomorrow
Client's mom, Nela, LVM requesting a call from Dr. Stockton. If return call is made tomorrow, please call after 12PM.    
Returned call again and spoke with Ms. Liz. She reported symptom recurrence over the last few days. Discussed that we would likely need to increase the dosage of medication, which I had also spoken with Tex about at our last appointment, but will plan to discuss this with Tex at next appointment, or if symptoms worsen acutely we can potentially make changes over the phone as I do not have any sooner appointments open. She agrees to call if there is any worsening of symptoms.   
lovenox

## 2024-04-26 NOTE — PATIENT PROFILE ADULT. - AS SC BRADEN MOISTURE
Bed/Stretcher in lowest position, wheels locked, appropriate side rails in place/Call bell, personal items and telephone in reach/Instruct patient to call for assistance before getting out of bed/chair/stretcher/Non-slip footwear applied when patient is off stretcher/[place] to call system/Physically safe environment - no spills, clutter or unnecessary equipment/Purposeful proactive rounding/Room/bathroom lighting operational, light cord in reach
(4) rarely moist

## 2025-05-22 NOTE — H&P ADULT - PROBLEM/PLAN-3
Care Transitions Program last Follow-up Call    Current Issues/Problems reviewed on call: Spoke to SDM, pt doing well. Pt had her MRI today. Continues with chronic neck pain. No other needs for today.     Details of Interventions/Education completed on call: Continue with current POC.     Review of Systems:   Care Transition System Evaluation: last CT call     Pain:  n/a  Pain Location: chronic pain to neck/shoulders    General Symptoms:  (denies any new symptoms or concerns for today)    The patient is taking all medications as prescribed. The patient did not have questions or concerns regarding the medications prescribed.    Transitional Care Management (TCM) appointment was completed.  TCM appointment plan of care and upcoming appointments were not  reviewed with patient.  Transportation to upcoming appointments to be provided by daughter.    CT program completed. No readmission in 30 days. Case closed.      DISPLAY PLAN FREE TEXT